# Patient Record
Sex: FEMALE | Race: WHITE
[De-identification: names, ages, dates, MRNs, and addresses within clinical notes are randomized per-mention and may not be internally consistent; named-entity substitution may affect disease eponyms.]

---
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
## 2019-11-07 PROBLEM — E10.9 CONTROLLED DIABETES MELLITUS TYPE 1 WITHOUT COMPLICATIONS (HCC): Status: ACTIVE | Noted: 2019-11-07

## 2019-11-28 PROCEDURE — 99222 1ST HOSP IP/OBS MODERATE 55: CPT | Performed by: INTERNAL MEDICINE

## 2019-12-02 PROBLEM — E10.9 CONTROLLED DIABETES MELLITUS TYPE 1 WITHOUT COMPLICATIONS (HCC): Status: RESOLVED | Noted: 2019-11-07 | Resolved: 2019-12-02

## 2021-01-26 PROBLEM — O36.8390 FETAL ARRHYTHMIA AFFECTING PREGNANCY, ANTEPARTUM: Status: ACTIVE | Noted: 2021-01-26

## 2021-01-26 PROBLEM — O24.019 TYPE 1 DIABETES MELLITUS DURING PREGNANCY, ANTEPARTUM: Status: ACTIVE | Noted: 2021-01-26

## 2021-04-15 PROBLEM — O99.820 GBS (GROUP B STREPTOCOCCUS CARRIER), +RV CULTURE, CURRENTLY PREGNANT: Status: ACTIVE | Noted: 2021-04-15

## 2024-02-14 ENCOUNTER — HOSPITAL ENCOUNTER (EMERGENCY)
Facility: HOSPITAL | Age: 24
Discharge: HOME | End: 2024-02-14
Attending: EMERGENCY MEDICINE
Payer: MEDICAID

## 2024-02-14 ENCOUNTER — APPOINTMENT (OUTPATIENT)
Dept: CT IMAGING | Facility: HOSPITAL | Age: 24
End: 2024-02-14
Attending: EMERGENCY MEDICINE
Payer: MEDICAID

## 2024-02-14 VITALS
SYSTOLIC BLOOD PRESSURE: 100 MMHG | TEMPERATURE: 97 F | BODY MASS INDEX: 35.31 KG/M2 | DIASTOLIC BLOOD PRESSURE: 70 MMHG | HEART RATE: 83 BPM | OXYGEN SATURATION: 97 % | WEIGHT: 225 LBS | HEIGHT: 67 IN | RESPIRATION RATE: 18 BRPM

## 2024-02-14 DIAGNOSIS — E87.6 HYPOKALEMIA: ICD-10-CM

## 2024-02-14 DIAGNOSIS — L03.311 ABDOMINAL WALL CELLULITIS: ICD-10-CM

## 2024-02-14 DIAGNOSIS — L02.211 ABDOMINAL WALL ABSCESS: Primary | ICD-10-CM

## 2024-02-14 DIAGNOSIS — E10.69 TYPE 1 DIABETES MELLITUS WITH OTHER SPECIFIED COMPLICATION (HCC): ICD-10-CM

## 2024-02-14 LAB
ANION GAP SERPL CALC-SCNC: 4 MMOL/L (ref 0–18)
BASOPHILS # BLD AUTO: 0.04 X10(3) UL (ref 0–0.2)
BASOPHILS NFR BLD AUTO: 0.4 %
BUN BLD-MCNC: 12 MG/DL (ref 9–23)
CALCIUM BLD-MCNC: 8.7 MG/DL (ref 8.5–10.1)
CHLORIDE SERPL-SCNC: 104 MMOL/L (ref 98–112)
CO2 SERPL-SCNC: 28 MMOL/L (ref 21–32)
CREAT BLD-MCNC: 0.67 MG/DL
EGFRCR SERPLBLD CKD-EPI 2021: 125 ML/MIN/1.73M2 (ref 60–?)
EOSINOPHIL # BLD AUTO: 0.22 X10(3) UL (ref 0–0.7)
EOSINOPHIL NFR BLD AUTO: 2 %
ERYTHROCYTE [DISTWIDTH] IN BLOOD BY AUTOMATED COUNT: 11.1 %
GLUCOSE BLD-MCNC: 216 MG/DL (ref 70–99)
HCG SERPL QL: NEGATIVE
HCT VFR BLD AUTO: 39.4 %
HGB BLD-MCNC: 13.9 G/DL
IMM GRANULOCYTES # BLD AUTO: 0.05 X10(3) UL (ref 0–1)
IMM GRANULOCYTES NFR BLD: 0.5 %
LACTATE SERPL-SCNC: 0.9 MMOL/L (ref 0.4–2)
LYMPHOCYTES # BLD AUTO: 2.18 X10(3) UL (ref 1–4)
LYMPHOCYTES NFR BLD AUTO: 20.1 %
MCH RBC QN AUTO: 28.4 PG (ref 26–34)
MCHC RBC AUTO-ENTMCNC: 35.3 G/DL (ref 31–37)
MCV RBC AUTO: 80.4 FL
MONOCYTES # BLD AUTO: 0.71 X10(3) UL (ref 0.1–1)
MONOCYTES NFR BLD AUTO: 6.6 %
NEUTROPHILS # BLD AUTO: 7.62 X10 (3) UL (ref 1.5–7.7)
NEUTROPHILS # BLD AUTO: 7.62 X10(3) UL (ref 1.5–7.7)
NEUTROPHILS NFR BLD AUTO: 70.4 %
OSMOLALITY SERPL CALC.SUM OF ELEC: 288 MOSM/KG (ref 275–295)
PLATELET # BLD AUTO: 330 10(3)UL (ref 150–450)
POTASSIUM SERPL-SCNC: 3.2 MMOL/L (ref 3.5–5.1)
RBC # BLD AUTO: 4.9 X10(6)UL
SODIUM SERPL-SCNC: 136 MMOL/L (ref 136–145)
WBC # BLD AUTO: 10.8 X10(3) UL (ref 4–11)

## 2024-02-14 PROCEDURE — 87077 CULTURE AEROBIC IDENTIFY: CPT | Performed by: EMERGENCY MEDICINE

## 2024-02-14 PROCEDURE — 87040 BLOOD CULTURE FOR BACTERIA: CPT | Performed by: EMERGENCY MEDICINE

## 2024-02-14 PROCEDURE — 36415 COLL VENOUS BLD VENIPUNCTURE: CPT

## 2024-02-14 PROCEDURE — 96365 THER/PROPH/DIAG IV INF INIT: CPT

## 2024-02-14 PROCEDURE — 87070 CULTURE OTHR SPECIMN AEROBIC: CPT | Performed by: EMERGENCY MEDICINE

## 2024-02-14 PROCEDURE — 10061 I&D ABSCESS COMP/MULTIPLE: CPT

## 2024-02-14 PROCEDURE — 99285 EMERGENCY DEPT VISIT HI MDM: CPT

## 2024-02-14 PROCEDURE — 87205 SMEAR GRAM STAIN: CPT | Performed by: EMERGENCY MEDICINE

## 2024-02-14 PROCEDURE — 99284 EMERGENCY DEPT VISIT MOD MDM: CPT

## 2024-02-14 PROCEDURE — 84703 CHORIONIC GONADOTROPIN ASSAY: CPT | Performed by: EMERGENCY MEDICINE

## 2024-02-14 PROCEDURE — 80048 BASIC METABOLIC PNL TOTAL CA: CPT | Performed by: EMERGENCY MEDICINE

## 2024-02-14 PROCEDURE — 87186 SC STD MICRODIL/AGAR DIL: CPT | Performed by: EMERGENCY MEDICINE

## 2024-02-14 PROCEDURE — 74177 CT ABD & PELVIS W/CONTRAST: CPT | Performed by: EMERGENCY MEDICINE

## 2024-02-14 PROCEDURE — 83605 ASSAY OF LACTIC ACID: CPT | Performed by: EMERGENCY MEDICINE

## 2024-02-14 PROCEDURE — 85025 COMPLETE CBC W/AUTO DIFF WBC: CPT | Performed by: EMERGENCY MEDICINE

## 2024-02-14 PROCEDURE — 96361 HYDRATE IV INFUSION ADD-ON: CPT

## 2024-02-14 RX ORDER — CLINDAMYCIN HYDROCHLORIDE 300 MG/1
300 CAPSULE ORAL 3 TIMES DAILY
Qty: 30 CAPSULE | Refills: 0 | Status: SHIPPED | OUTPATIENT
Start: 2024-02-14 | End: 2024-02-24

## 2024-02-14 RX ORDER — SPIRONOLACTONE 100 MG/1
150 TABLET, FILM COATED ORAL DAILY
COMMUNITY

## 2024-02-14 RX ORDER — POTASSIUM CHLORIDE 20 MEQ/1
40 TABLET, EXTENDED RELEASE ORAL ONCE
Status: COMPLETED | OUTPATIENT
Start: 2024-02-14 | End: 2024-02-14

## 2024-02-14 RX ORDER — CLINDAMYCIN PHOSPHATE 600 MG/50ML
600 INJECTION, SOLUTION INTRAVENOUS ONCE
Status: COMPLETED | OUTPATIENT
Start: 2024-02-14 | End: 2024-02-14

## 2024-02-14 NOTE — ED INITIAL ASSESSMENT (HPI)
C/o redness at her insulin pump site. States she switched her site but the redness continued to spread. No fevers.

## 2024-02-14 NOTE — ED PROVIDER NOTES
Patient Seen in: [facility] Emergency Department      History     Chief Complaint   Patient presents with    Cellulitis     Stated Complaint: patient reports redness and swelling on the abd from an insulin pump site    Subjective:   HPI    This is a 23-year-old female, history of diabetes mellitus, presents emergency room for evaluation of redness and swelling to the abdominal wall.  Patient states she started having redness to insulin pump site to the right lower abdomen approximately 1 week ago, states she switched the site but the erythema and swelling has worsened.  It is now burning across the abdomen.  Denies any fevers or chills.  Denies nausea or vomiting.  Denies chest pain or shortness of breath.  Denies urinary symptoms    Objective:   Past Medical History:   Diagnosis Date    Diabetes mellitus type 1 (HCC)     Type 1 diabetes mellitus (HCC)               Past Surgical History:   Procedure Laterality Date      2021    TONSILLECTOMY                  Social History     Socioeconomic History    Marital status: Single    Number of children: 0   Occupational History    Occupation:    Tobacco Use    Smoking status: Never    Smokeless tobacco: Never   Vaping Use    Vaping Use: Never used   Substance and Sexual Activity    Alcohol use: Yes     Comment: occ    Drug use: Never    Sexual activity: Yes     Partners: Male     Birth control/protection: Condom              Review of Systems    Positive for stated complaint: patient reports redness and swelling on the abd from an insulin pump site  Other systems are as noted in HPI.  Constitutional and vital signs reviewed.      All other systems reviewed and negative except as noted above.    Physical Exam     ED Triage Vitals [24 0508]   /84   Pulse 101   Resp 18   Temp 97.3 °F (36.3 °C)   Temp src Temporal   SpO2 95 %   O2 Device None (Room air)       Current:/84   Pulse 101   Temp 97.3 °F (36.3 °C) (Temporal)    Resp 18   Ht 170.2 cm (5' 7\")   Wt 102.1 kg   LMP 02/01/2024   SpO2 95%   BMI 35.24 kg/m²         Physical Exam    GENERAL: Patient is awake, alert, well-appearing, in no acute distress.  HEENT: no scleral icterus.  Mucous membranes are slightly dry   NECK: Neck is supple, there is no nuchal rigidity.   HEART: Regular rate and rhythm, no murmurs.  LUNGS: Clear to auscultation bilaterally.  No Rales, no rhonchi, no wheezing, no stridor.  ABDOMEN: Soft, nondistended, there is a 3 x 3 cm area of swelling with slight fluctuance with surrounding erythema and induration to the right lower abdominal wall.    EXTREMITIES: No peripheral edema  ED Course     Labs Reviewed   BASIC METABOLIC PANEL (8) - Abnormal; Notable for the following components:       Result Value    Glucose 216 (*)     Potassium 3.2 (*)     All other components within normal limits   HCG, BETA SUBUNIT, QUAL - Normal   LACTIC ACID, PLASMA - Normal   CBC WITH DIFFERENTIAL WITH PLATELET    Narrative:     The following orders were created for panel order CBC With Differential With Platelet.  Procedure                               Abnormality         Status                     ---------                               -----------         ------                     CBC W/ DIFFERENTIAL[[id number]]                              Final result                 Please view results for these tests on the individual orders.   URINALYSIS, ROUTINE   BLOOD CULTURE   BLOOD CULTURE   CBC W/ DIFFERENTIAL     CT abdomen pelvis enhanced      IMPRESSION:  Right more than left abdominal wall skin thickening measuring up to 17 mm in thickness series 2 image 70 to  No focal drainable fluid collection  No subcutaneous emphysema  No evidence of extension into the peritoneal cavity  Prominent right colonic stool  Incidental 3.5 cm left ovarian cyst  Intrauterine device appears adequately positioned  Calcified pelvic phleboliths  No AAA, bowel obstruction or free air  Minimal fluid  reflux lower esophagus  Several prominent bilateral inguinal lymph nodes up to 1 cm, possible mild reactive lymphadenitis      Lung bases are clear    Abdominal wall findings may be due to cellulitis, consider dermatology follow-up if deemed clinically appropriate        The abscess was anesthetized with lidocaine with epinephrine. A transverse incision was performed with an 11 blade.  Pus was expressed and loculations were freed.  The abscess cavity was irrigated copiously.  Cultures performed.  Patient tolerated procedure well.  No complication.             MDM        Differential diagnosis before testing includes but not limited to cellulitis, abscess, intra-abdominal extension of abscess, electrolyte abnormality, DKA, which is a medical condition that poses a threat to life/function    Past Medical History/comorbidities-diabetes mellitus      Radiographic images  I personally reviewed the radiographs and my individual interpretation shows CT no free air  I also reviewed the official reports that showed CT marked skin thickening right side of abdomen, no fluid collection.  No evidence of acute infarct or process in the peritoneal.  Appendix unremarkable.        Medications Provided: IV clindamycin    Course of Events during Emergency Room Visit include IV established blood work obtained.  CT of the abdomen/pelvis performed.  CBC was unremarkable.  Chemistry sodium 136 potassium 2.2 BUN 12 creatinine 0.67 glucose 216.  Patient did receive oral potassium.  Patient was given dose of IV clindamycin, incision and drainage was performed, I discussed wound care including wound check in 48 hours.  Patient will be discharged with oral antibiotics, wound check asabove return to ER if any change or worsening symptoms.  Patient instructed to choose a site remote from the erythema to place her insulin pump.  Patient agrees plan well-appearing vital signs stable discharge good condition    Shared decision making was utilized        Discharge  I have discussed with the patient the results of test, differential diagnosis, treatment plan, warning signs and symptoms which should prompt immediate return.  They expressed understanding of these instructions and agrees to the following plan provided.  They were given written discharge instructions and agrees to return for any concerns and voiced understanding and all questions were answered.    Note to patient: The 21st Century Cures Act makes medical notes like these available to patients in the interest of transparency. However, this is a medical document intended as peer to peer communication. It is written in medical language and may contain abbreviations or verbiage that are unfamiliar. It may appear blunt or direct. Medical documents are intended to carry relevant information, facts as evident, and the clinical opinion of the practitioner.                                            Medical Decision Making      Disposition and Plan     Clinical Impression:  1. Abdominal wall abscess    2. Abdominal wall cellulitis    3. Type 1 diabetes mellitus with other specified complication (HCC)    4. Hypokalemia         Disposition:  Discharge  2/14/2024  7:13 am    Follow-up:  Nolvia Stacy MD  [address] [place] [address]  [facility] [address]  [phone]    Follow up in 2 day(s)            Medications Prescribed:  Current Discharge Medication List        START taking these medications    Details   clindamycin 300 MG Oral Cap Take 1 capsule (300 mg total) by mouth 3 (three) times daily for 10 days.  Qty: 30 capsule, Refills: 0

## 2024-02-14 NOTE — DISCHARGE INSTRUCTIONS
Wound check in 48 hours.  Return to ER if any change or worsening symptoms.  Do not place your insulin pump in the area of the skin that is red, choose a site remote/distant from this area

## 2024-02-16 NOTE — PROGRESS NOTES
ED Culture Callback Results Review    Pharmacist reviewed culture results from ED visit.    Final wound culture positive for Staphylococcus aureus. Patient was prescribed Clindamycin (Cleocin) on discharge. Current therapy is appropriate based on reported susceptibilities. No further intervention required at this time.      Thank you,    Julieta Camargo, PharmD, BCPS, [facility]  Clinical Pharmacy Specialist - Emergency Medicine  02/16/24; 11:57 AM